# Patient Record
Sex: MALE | Race: WHITE | HISPANIC OR LATINO | URBAN - METROPOLITAN AREA
[De-identification: names, ages, dates, MRNs, and addresses within clinical notes are randomized per-mention and may not be internally consistent; named-entity substitution may affect disease eponyms.]

---

## 2017-11-13 ENCOUNTER — ALLSCRIPTS OFFICE VISIT (OUTPATIENT)
Dept: OTHER | Facility: OTHER | Age: 47
End: 2017-11-13

## 2018-01-12 NOTE — MISCELLANEOUS
Provider Comments  Provider Comments:   Called patient and left message for him to call and reschedule missed appointment      Signatures   Electronically signed by : Elian Forde DO; Nov 13 2017 11:06AM EST                       (Author)

## 2018-01-16 NOTE — PROGRESS NOTES
Assessment    1  Wheezing (786 07) (R06 2)   2  Shortness of breath (786 05) (R06 02)    Plan  Shortness of breath    · Levofloxacin 750 MG Oral Tablet (Levaquin); 1 every day   · PredniSONE 20 MG Oral Tablet; 2 Every Morning   · ProAir  (90 Base) MCG/ACT Inhalation Aerosol Solution; 2  PUFFS Q 4-6  HOURS PRN  LEONIDAS   · Promethazine-DM 6 25-15 MG/5ML Oral Syrup; TAKE 5 ML EVERY 4 TO 6 HOURS  AS NEEDED FOR COUGH   · * XR CHEST PA & LATERAL; Status:Active; Requested for:22Jan2016;    · MINI NEBULIZER- POC; Status:Complete;   Done: [id number]    Discussion/Summary    Rto in 2 days  Possible side effects of new medications were reviewed with the patient/guardian today  The treatment plan was reviewed with the patient/guardian  The patient/guardian understands and agrees with the treatment plan      Chief Complaint  Patient is here for c/o SOB and cough x1 week  History of Present Illness  HPI: 41 y o smoker with prior h/o of Asthma or COPD seen for cold symptoms x 1 wk, last 2 days worsening with SOB and wheezing, stopped ETOH 2 wks ago, able to eat but less appetite, no N/V, no fever, feels tired, started on B med 2 wks ago      Review of Systems    Constitutional: feeling poorly and feeling tired, but no fever, no recent weight gain and no recent weight loss  ENT: no complaints of earache, no loss of hearing, no nosebleeds or nasal discharge, no sore throat or hoarseness  Cardiovascular: no complaints of slow or fast heart rate, no chest pain, no palpitations, no leg claudication or lower extremity edema  Respiratory: shortness of breath and wheezing, but as noted in HPI  Gastrointestinal: no complaints of abdominal pain, no constipation, no nausea or vomiting, no diarrhea or bloody stools  Genitourinary: no complaints of dysuria or incontinence, no hesitancy, no nocturia, no genital lesion, no inadequacy of penile erection     Musculoskeletal: no complaints of arthralgia, no myalgia, no joint swelling or stiffness, no limb pain or swelling  Integumentary: no complaints of skin rash or lesion, no itching or dry skin, no skin wounds  Active Problems    1  Encounter for annual physical exam (V70 0) (Z00 00)   2  Essential hypertension (401 9) (I10)    Past Medical History  Active Problems And Past Medical History Reviewed: The active problems and past medical history were reviewed and updated today  Family History    1  Family history of Colon cancer   2  Family history of HTN (hypertension)    3  Family history of HTN (hypertension)  Family History Reviewed: The family history was reviewed and updated today  Social History    · Alcohol use   · Current every day smoker (305 1) (F17 200)   · No drug use  The social history was reviewed and updated today  Surgical History    1  History of Surgery Excision Lipoma   2  History of Umbilical Hernia Repair    Current Meds   1  AmLODIPine Besylate 5 MG Oral Tablet; TAKE 1 TABLET DAILY; Therapy: [id number] to (Evaluate:06Jan2017)  Requested for: 12Jan2016; Last   Rx:12Jan2016 Ordered    Allergies    1  No Known Drug Allergies    2  No Known Food Allergies    Vitals   Recorded: [id number] 09:12AM   Temperature 98 9 F   Heart Rate 88   Respiration 18   Respiration Quality Normal   Systolic 557   Diastolic 82   Height 5 ft 9 5 in   Weight 178 lb    BMI Calculated 25 91   BSA Calculated 1 98     Physical Exam    Constitutional   General appearance: Abnormal   acutely ill and comfortable  Eyes   Conjunctiva and lids: No swelling, erythema, or discharge  Ears, Nose, Mouth, and Throat   Oropharynx: Normal with no erythema, edema, exudate or lesions  Pulmonary   Respiratory effort: Abnormal   Assessment of respiratory effort revealed accessory muscle use and distress  Respiratory Findings: wet cough  Auscultation of lungs: Abnormal   no rales or crackles were heard bilaterally  no rhonchi  diffuse wheezing bilaterally     Cardiovascular Auscultation of heart: Normal rate and rhythm, normal S1 and S2, without murmurs  Abdomen   Abdomen: Non-tender, no masses  Procedure      Treatment #1 included Albuterol 2 5 mg and Ipratropium 0 5 mg  After treatment #1, the patient noted symptomatic improvement  Air exchange was improved  Future Appointments    Date/Time Provider Specialty Site   01/26/2016 08:30 AM SANDEE Cerda   [facility] [address]     Signatures   Electronically signed by : SANDEE Resendiz ; Jan 24 2016 10:26AM EST                       (Author)

## 2018-03-26 RX ORDER — AMLODIPINE BESYLATE 5 MG/1
1 TABLET ORAL DAILY
COMMUNITY
Start: 2016-01-12